# Patient Record
Sex: MALE | Race: WHITE | NOT HISPANIC OR LATINO
[De-identification: names, ages, dates, MRNs, and addresses within clinical notes are randomized per-mention and may not be internally consistent; named-entity substitution may affect disease eponyms.]

---

## 2019-06-15 ENCOUNTER — HOSPITAL (OUTPATIENT)
Dept: OTHER | Age: 29
End: 2019-06-15
Attending: EMERGENCY MEDICINE

## 2019-06-18 LAB
BACTERIA STL CULT: NORMAL

## 2020-08-13 ENCOUNTER — OFFICE VISIT (OUTPATIENT)
Dept: INTERNAL MEDICINE CLINIC | Facility: CLINIC | Age: 30
End: 2020-08-13
Payer: COMMERCIAL

## 2020-08-13 VITALS
TEMPERATURE: 98 F | OXYGEN SATURATION: 99 % | DIASTOLIC BLOOD PRESSURE: 86 MMHG | SYSTOLIC BLOOD PRESSURE: 124 MMHG | WEIGHT: 202 LBS | HEIGHT: 71.75 IN | RESPIRATION RATE: 16 BRPM | BODY MASS INDEX: 27.66 KG/M2 | HEART RATE: 89 BPM

## 2020-08-13 DIAGNOSIS — Z00.00 PREVENTATIVE HEALTH CARE: ICD-10-CM

## 2020-08-13 DIAGNOSIS — K92.1 HEMATOCHEZIA: ICD-10-CM

## 2020-08-13 DIAGNOSIS — K42.9 UMBILICAL HERNIA WITHOUT OBSTRUCTION AND WITHOUT GANGRENE: Primary | ICD-10-CM

## 2020-08-13 PROCEDURE — 3074F SYST BP LT 130 MM HG: CPT | Performed by: INTERNAL MEDICINE

## 2020-08-13 PROCEDURE — 99203 OFFICE O/P NEW LOW 30 MIN: CPT | Performed by: INTERNAL MEDICINE

## 2020-08-13 PROCEDURE — 3008F BODY MASS INDEX DOCD: CPT | Performed by: INTERNAL MEDICINE

## 2020-08-13 PROCEDURE — 3079F DIAST BP 80-89 MM HG: CPT | Performed by: INTERNAL MEDICINE

## 2020-08-13 NOTE — PROGRESS NOTES
Suki Mao is a 26year old male. HPI:   Patient presents with:  New Patient  Establish Care  Hernia    Patient presents to establish care. He thinks he has an umbilical hernia. 1 week ago, pushed on umbilical area, felt a bulge.   He is not sure ho 16   Ht 71.75\"   Wt 202 lb (91.6 kg)   SpO2 99%   BMI 27.59 kg/m²   GENERAL: Alert and oriented, well developed, well nourished,in no apparent distress  SKIN: no rashes,no suspicious lesions  HEENT: atraumatic, PERRLA, EOMI, normal lid and conjunctiva  NE

## 2020-08-13 NOTE — PATIENT INSTRUCTIONS
- Follow up with General Surgery/Dr. Akila Patel to discuss your umbilical (belly button) hernia  - Go to the emergency department if you develop severe belly button pain, fevers/chills/sweats, severe constipation, constant nausea/vomiting (these can be signs

## 2020-08-16 PROBLEM — K92.1 HEMATOCHEZIA: Status: ACTIVE | Noted: 2020-08-16

## 2020-08-16 PROBLEM — K42.9 UMBILICAL HERNIA WITHOUT OBSTRUCTION AND WITHOUT GANGRENE: Status: ACTIVE | Noted: 2020-08-16

## 2020-08-18 ENCOUNTER — OFFICE VISIT (OUTPATIENT)
Dept: SURGERY | Facility: CLINIC | Age: 30
End: 2020-08-18
Payer: COMMERCIAL

## 2020-08-18 VITALS
WEIGHT: 202 LBS | DIASTOLIC BLOOD PRESSURE: 84 MMHG | HEART RATE: 69 BPM | TEMPERATURE: 98 F | BODY MASS INDEX: 27.66 KG/M2 | SYSTOLIC BLOOD PRESSURE: 140 MMHG | HEIGHT: 71.75 IN

## 2020-08-18 DIAGNOSIS — K92.1 HEMATOCHEZIA: ICD-10-CM

## 2020-08-18 DIAGNOSIS — K42.9 UMBILICAL HERNIA WITHOUT OBSTRUCTION AND WITHOUT GANGRENE: Primary | ICD-10-CM

## 2020-08-18 DIAGNOSIS — K64.8 BLEEDING INTERNAL HEMORRHOIDS: ICD-10-CM

## 2020-08-18 PROCEDURE — 46600 DIAGNOSTIC ANOSCOPY SPX: CPT | Performed by: SURGERY

## 2020-08-18 PROCEDURE — 3079F DIAST BP 80-89 MM HG: CPT | Performed by: SURGERY

## 2020-08-18 PROCEDURE — 99244 OFF/OP CNSLTJ NEW/EST MOD 40: CPT | Performed by: SURGERY

## 2020-08-18 PROCEDURE — 3077F SYST BP >= 140 MM HG: CPT | Performed by: SURGERY

## 2020-08-18 PROCEDURE — 3008F BODY MASS INDEX DOCD: CPT | Performed by: SURGERY

## 2020-08-18 NOTE — H&P (VIEW-ONLY)
New Patient Visit Note       Active Problems      1. Umbilical hernia without obstruction and without gangrene    2. Hematochezia    3.  Bleeding internal hemorrhoids        Chief Complaint   Patient presents with:  Umbilical Hernia: pt ref by Dr. Aylin fountain Past Medical / Surgical / Social / Family History    The past medical and past surgical history have been reviewed by me today. History reviewed. No pertinent past medical history.   Past Surgical History:   Procedure Laterality Date   • FEMUR FRACTURE S Abdominal: He exhibits no distension and no mass. There is no tenderness. There is no rebound and no guarding. A hernia is present. Hernia confirmed positive in the ventral area.  Hernia confirmed negative in the right inguinal area and confirmed negative i · The cherelle-operative care plan was discussed with the patient, who voices understanding. Activity and lifting recommendations were discussed in length. ·   · The risks, benefits, and alternatives to the procedure were explained to the patient.   The risk

## 2020-08-18 NOTE — H&P
New Patient Visit Note       Active Problems      1. Umbilical hernia without obstruction and without gangrene    2. Hematochezia    3.  Bleeding internal hemorrhoids        Chief Complaint   Patient presents with:  Umbilical Hernia: pt ref by Dr. Violet fountain Past Medical / Surgical / Social / Family History    The past medical and past surgical history have been reviewed by me today. History reviewed. No pertinent past medical history.   Past Surgical History:   Procedure Laterality Date   • FEMUR FRACTURE S Abdominal: He exhibits no distension and no mass. There is no tenderness. There is no rebound and no guarding. A hernia is present. Hernia confirmed positive in the ventral area.  Hernia confirmed negative in the right inguinal area and confirmed negative i · The cherelle-operative care plan was discussed with the patient, who voices understanding. Activity and lifting recommendations were discussed in length. ·   · The risks, benefits, and alternatives to the procedure were explained to the patient.   The risk

## 2020-08-28 ENCOUNTER — APPOINTMENT (OUTPATIENT)
Dept: LAB | Age: 30
End: 2020-08-28
Attending: SURGERY
Payer: COMMERCIAL

## 2020-08-28 DIAGNOSIS — K42.9 UMBILICAL HERNIA WITHOUT OBSTRUCTION AND WITHOUT GANGRENE: ICD-10-CM

## 2020-08-29 LAB — SARS-COV-2 RNA RESP QL NAA+PROBE: NOT DETECTED

## 2020-08-30 ENCOUNTER — ANESTHESIA EVENT (OUTPATIENT)
Dept: SURGERY | Facility: HOSPITAL | Age: 30
End: 2020-08-30
Payer: COMMERCIAL

## 2020-08-31 ENCOUNTER — HOSPITAL ENCOUNTER (OUTPATIENT)
Facility: HOSPITAL | Age: 30
Setting detail: HOSPITAL OUTPATIENT SURGERY
Discharge: HOME | End: 2020-08-31
Attending: SURGERY | Admitting: SURGERY
Payer: COMMERCIAL

## 2020-08-31 ENCOUNTER — ANESTHESIA (OUTPATIENT)
Dept: SURGERY | Facility: HOSPITAL | Age: 30
End: 2020-08-31
Payer: COMMERCIAL

## 2020-08-31 ENCOUNTER — TELEPHONE (OUTPATIENT)
Dept: SURGERY | Facility: CLINIC | Age: 30
End: 2020-08-31

## 2020-08-31 VITALS
WEIGHT: 197.06 LBS | RESPIRATION RATE: 16 BRPM | DIASTOLIC BLOOD PRESSURE: 75 MMHG | BODY MASS INDEX: 27.59 KG/M2 | TEMPERATURE: 98 F | SYSTOLIC BLOOD PRESSURE: 120 MMHG | OXYGEN SATURATION: 100 % | HEIGHT: 71 IN | HEART RATE: 51 BPM

## 2020-08-31 DIAGNOSIS — K42.9 UMBILICAL HERNIA WITHOUT OBSTRUCTION AND WITHOUT GANGRENE: Primary | ICD-10-CM

## 2020-08-31 PROCEDURE — 0WQF0ZZ REPAIR ABDOMINAL WALL, OPEN APPROACH: ICD-10-PCS | Performed by: SURGERY

## 2020-08-31 PROCEDURE — 49587 REPAIR UMBILICAL HERN,5+Y/O,STRANG: CPT | Performed by: SURGERY

## 2020-08-31 RX ORDER — MEPERIDINE HYDROCHLORIDE 25 MG/ML
12.5 INJECTION INTRAMUSCULAR; INTRAVENOUS; SUBCUTANEOUS AS NEEDED
Status: DISCONTINUED | OUTPATIENT
Start: 2020-08-31 | End: 2020-08-31

## 2020-08-31 RX ORDER — NALOXONE HYDROCHLORIDE 0.4 MG/ML
80 INJECTION, SOLUTION INTRAMUSCULAR; INTRAVENOUS; SUBCUTANEOUS AS NEEDED
Status: DISCONTINUED | OUTPATIENT
Start: 2020-08-31 | End: 2020-08-31

## 2020-08-31 RX ORDER — ACETAMINOPHEN 500 MG
1000 TABLET ORAL ONCE
Status: DISCONTINUED | OUTPATIENT
Start: 2020-08-31 | End: 2020-08-31 | Stop reason: HOSPADM

## 2020-08-31 RX ORDER — LABETALOL HYDROCHLORIDE 5 MG/ML
5 INJECTION, SOLUTION INTRAVENOUS EVERY 5 MIN PRN
Status: DISCONTINUED | OUTPATIENT
Start: 2020-08-31 | End: 2020-08-31

## 2020-08-31 RX ORDER — SODIUM CHLORIDE, SODIUM LACTATE, POTASSIUM CHLORIDE, CALCIUM CHLORIDE 600; 310; 30; 20 MG/100ML; MG/100ML; MG/100ML; MG/100ML
INJECTION, SOLUTION INTRAVENOUS CONTINUOUS
Status: DISCONTINUED | OUTPATIENT
Start: 2020-08-31 | End: 2020-08-31

## 2020-08-31 RX ORDER — BUPIVACAINE HYDROCHLORIDE AND EPINEPHRINE 5; 5 MG/ML; UG/ML
INJECTION, SOLUTION EPIDURAL; INTRACAUDAL; PERINEURAL AS NEEDED
Status: DISCONTINUED | OUTPATIENT
Start: 2020-08-31 | End: 2020-08-31 | Stop reason: HOSPADM

## 2020-08-31 RX ORDER — HEPARIN SODIUM 5000 [USP'U]/ML
5000 INJECTION, SOLUTION INTRAVENOUS; SUBCUTANEOUS ONCE
Status: COMPLETED | OUTPATIENT
Start: 2020-08-31 | End: 2020-08-31

## 2020-08-31 RX ORDER — HYDROCODONE BITARTRATE AND ACETAMINOPHEN 5; 325 MG/1; MG/1
1 TABLET ORAL AS NEEDED
Status: DISCONTINUED | OUTPATIENT
Start: 2020-08-31 | End: 2020-08-31

## 2020-08-31 RX ORDER — HYDROMORPHONE HYDROCHLORIDE 1 MG/ML
0.4 INJECTION, SOLUTION INTRAMUSCULAR; INTRAVENOUS; SUBCUTANEOUS EVERY 5 MIN PRN
Status: DISCONTINUED | OUTPATIENT
Start: 2020-08-31 | End: 2020-08-31

## 2020-08-31 RX ORDER — HYDROCODONE BITARTRATE AND ACETAMINOPHEN 5; 325 MG/1; MG/1
TABLET ORAL
Qty: 20 TABLET | Refills: 0 | Status: SHIPPED | OUTPATIENT
Start: 2020-08-31 | End: 2020-09-22

## 2020-08-31 RX ORDER — CEFAZOLIN SODIUM/WATER 2 G/20 ML
2 SYRINGE (ML) INTRAVENOUS ONCE
Status: COMPLETED | OUTPATIENT
Start: 2020-08-31 | End: 2020-08-31

## 2020-08-31 RX ORDER — MIDAZOLAM HYDROCHLORIDE 1 MG/ML
INJECTION INTRAMUSCULAR; INTRAVENOUS AS NEEDED
Status: DISCONTINUED | OUTPATIENT
Start: 2020-08-31 | End: 2020-08-31 | Stop reason: SURG

## 2020-08-31 RX ORDER — HYDROCODONE BITARTRATE AND ACETAMINOPHEN 5; 325 MG/1; MG/1
1 TABLET ORAL EVERY 6 HOURS PRN
Qty: 20 TABLET | Refills: 0 | Status: SHIPPED | OUTPATIENT
Start: 2020-08-31 | End: 2020-09-22

## 2020-08-31 RX ORDER — DOCUSATE SODIUM 100 MG/1
100 CAPSULE, LIQUID FILLED ORAL 3 TIMES DAILY
Qty: 90 CAPSULE | Refills: 0 | Status: SHIPPED | OUTPATIENT
Start: 2020-08-31

## 2020-08-31 RX ORDER — ONDANSETRON 2 MG/ML
4 INJECTION INTRAMUSCULAR; INTRAVENOUS AS NEEDED
Status: DISCONTINUED | OUTPATIENT
Start: 2020-08-31 | End: 2020-08-31

## 2020-08-31 RX ORDER — HYDROCODONE BITARTRATE AND ACETAMINOPHEN 5; 325 MG/1; MG/1
2 TABLET ORAL AS NEEDED
Status: DISCONTINUED | OUTPATIENT
Start: 2020-08-31 | End: 2020-08-31

## 2020-08-31 RX ORDER — MIDAZOLAM HYDROCHLORIDE 1 MG/ML
1 INJECTION INTRAMUSCULAR; INTRAVENOUS EVERY 5 MIN PRN
Status: DISCONTINUED | OUTPATIENT
Start: 2020-08-31 | End: 2020-08-31

## 2020-08-31 RX ORDER — ACETAMINOPHEN 500 MG
1000 TABLET ORAL ONCE AS NEEDED
Status: DISCONTINUED | OUTPATIENT
Start: 2020-08-31 | End: 2020-08-31

## 2020-08-31 RX ADMIN — MIDAZOLAM HYDROCHLORIDE 2 MG: 1 INJECTION INTRAMUSCULAR; INTRAVENOUS at 13:32:00

## 2020-08-31 RX ADMIN — CEFAZOLIN SODIUM/WATER 2 G: 2 G/20 ML SYRINGE (ML) INTRAVENOUS at 13:40:00

## 2020-08-31 RX ADMIN — SODIUM CHLORIDE, SODIUM LACTATE, POTASSIUM CHLORIDE, CALCIUM CHLORIDE: 600; 310; 30; 20 INJECTION, SOLUTION INTRAVENOUS at 14:21:00

## 2020-08-31 NOTE — TELEPHONE ENCOUNTER
8/31 pt had surgery today and states he did not get any paperwork or pain medication. Norco script sent today with receipt confirmation at 2:07pm. Called pharmacy to confirm  And they cannot find the script. New script sent by PA.  Pt also stated he did not

## 2020-08-31 NOTE — INTERVAL H&P NOTE
Pre-op Diagnosis: Umbilical hernia without obstruction and without gangrene [K42.9]    The above referenced H&P was reviewed by Tyrone Mcrae MD on 8/31/2020, the patient was examined and no significant changes have occurred in the patient's condition

## 2020-08-31 NOTE — BRIEF OP NOTE
Pre-Operative Diagnosis: Umbilical hernia without obstruction and without gangrene [K42.9]     Post-Operative Diagnosis: Umbilical hernia without obstruction and without gangrene [K42.9]      Procedure Performed:   Procedure(s):    UMBILICAL HERNIORRHAPHY

## 2020-08-31 NOTE — ANESTHESIA PREPROCEDURE EVALUATION
PRE-OP EVALUATION    Patient Name: Vazquez Perry    Pre-op Diagnosis: Umbilical hernia without obstruction and without gangrene [K42.9]    Procedure(s):    UMBILICAL HERNIORRHAPHY    Surgeon(s) and Role:     * Karen Deleon MD - Primary    Pre-op vi for MAC.  A detailed discussion of the risks and benefits of the proposed anesthetic was had in the preoperative area, including risk of conversion to a general anesthetic, nausea/vomiting, dental damage, sore throat and allergic/ adverse reactions to [employer]

## 2020-08-31 NOTE — PROGRESS NOTES
Pt states he has poison ivy on R lower leg near ankle area for past 2 weeks. Reddened area approx 2 inch circled area weeping, open to air, denies pain.

## 2020-08-31 NOTE — ANESTHESIA POSTPROCEDURE EVALUATION
[facility] [place] Patient Status:  Hospital Outpatient Surgery   Age/Gender 26year old male MRN [id number]   Location [facility] Attending Bridger Jara MD   Hosp Day # 0 PCP Dianne Camargo MD       Anesth

## 2020-08-31 NOTE — OPERATIVE REPORT
PREOPERATIVE DIAGNOSIS: Incarcerated umbilical hernia. POSTOPERATIVE DIAGNOSIS: Incarcerated umbilical hernia. PROCEDURE PERFORMED: Repair of incarcerated umbilical hernia. ASSISTANT: OR staff. ANESTHESIA: Monitored anesthesia care.    ANESTHESIOLOG usual sterile fashion. Local anesthetic is injected in the superior aspect of the umbilicus, and a curvilinear incision is created in the superior aspect of the umbilicus using a scalpel to incise the skin and subcutaneous tissue.  Blunt dissection is bibiana

## 2020-09-08 ENCOUNTER — OFFICE VISIT (OUTPATIENT)
Dept: SURGERY | Facility: CLINIC | Age: 30
End: 2020-09-08
Payer: COMMERCIAL

## 2020-09-08 VITALS — TEMPERATURE: 100 F

## 2020-09-08 DIAGNOSIS — K64.8 BLEEDING INTERNAL HEMORRHOIDS: Primary | ICD-10-CM

## 2020-09-08 PROCEDURE — 46221 LIGATION OF HEMORRHOID(S): CPT | Performed by: SURGERY

## 2020-09-08 NOTE — PROGRESS NOTES
Follow Up Visit Note       Active Problems      1.  Bleeding internal hemorrhoids          Chief Complaint   Patient presents with:  Hemorrhoid Banding: pt denies bleeding or pain        History of Present Illness    The patient presents for rubber band lig Daily     Current Outpatient Medications   Medication Sig Dispense Refill   • HYDROcodone-acetaminophen (NORCO) 5-325 MG Oral Tab Take 1 or 2 tablets every 4  To 6 hours for pain (Patient not taking: Reported on 9/8/2020 ) 20 tablet 0   • docusate sodium ( listed in the physical exam section of this note.            Assessment   Bleeding internal hemorrhoids  (primary encounter diagnosis)    Plan     · Successful treatment of internal hemorrhoid of the right anterolateral position by rubber band ligation and

## 2020-09-11 ENCOUNTER — MED REC SCAN ONLY (OUTPATIENT)
Dept: SURGERY | Facility: CLINIC | Age: 30
End: 2020-09-11

## 2020-09-22 ENCOUNTER — OFFICE VISIT (OUTPATIENT)
Dept: SURGERY | Facility: CLINIC | Age: 30
End: 2020-09-22
Payer: COMMERCIAL

## 2020-09-22 VITALS
TEMPERATURE: 98 F | DIASTOLIC BLOOD PRESSURE: 89 MMHG | BODY MASS INDEX: 28 KG/M2 | WEIGHT: 200 LBS | HEART RATE: 60 BPM | SYSTOLIC BLOOD PRESSURE: 136 MMHG

## 2020-09-22 DIAGNOSIS — K64.8 BLEEDING INTERNAL HEMORRHOIDS: Primary | ICD-10-CM

## 2020-09-22 PROCEDURE — 3079F DIAST BP 80-89 MM HG: CPT | Performed by: SURGERY

## 2020-09-22 PROCEDURE — 3075F SYST BP GE 130 - 139MM HG: CPT | Performed by: SURGERY

## 2020-09-22 PROCEDURE — 46221 LIGATION OF HEMORRHOID(S): CPT | Performed by: SURGERY

## 2020-09-22 NOTE — PROGRESS NOTES
Follow Up Visit Note       Active Problems      1. Bleeding internal hemorrhoids          Chief Complaint   Patient presents with:  Hemorrhoid Banding: continuity of care for 2 nd banding. Denies pain nor bleeding. Normal BM.  Denies fever, chills, nausea n on 9/8/2020 ) 90 capsule 0        Review of Systems  The Review of Systems has been reviewed by me during today.   Review of Systems     Physical Findings   /89   Pulse 60   Temp 98.2 °F (36.8 °C)   Wt 200 lb (90.7 kg)   BMI 27.89 kg/m²   Pre op diagn dietary fiber supplementation, stool softeners, and adequate hydration. ·   · Follow-up in 2 weeks for continued treatment of internal hemorrhoids. ·   · The patient was provided ample opportunity to ask questions.   · All of the patient's questions were

## 2020-09-22 NOTE — PROGRESS NOTES
Follow Up Visit Note       Active Problems      No diagnosis found. Chief Complaint   Patient presents with:  Hemorrhoid Banding: continuity of care for 2 nd banding        History of Present Illness        Allergies  Mel Rosalva has No Known Allergies. (six) hours as needed for Pain. (Patient not taking: Reported on 9/8/2020 ), Disp: 20 tablet, Rfl: 0     Review of Systems  The Review of Systems has been reviewed by me during today.   Review of Systems   Constitutional: Negative for chills, diaphoresis, f

## 2020-10-06 ENCOUNTER — OFFICE VISIT (OUTPATIENT)
Dept: SURGERY | Facility: CLINIC | Age: 30
End: 2020-10-06
Payer: COMMERCIAL

## 2020-10-06 VITALS — TEMPERATURE: 98 F | HEART RATE: 63 BPM | SYSTOLIC BLOOD PRESSURE: 135 MMHG | DIASTOLIC BLOOD PRESSURE: 92 MMHG

## 2020-10-06 DIAGNOSIS — K64.8 BLEEDING INTERNAL HEMORRHOIDS: Primary | ICD-10-CM

## 2020-10-06 PROCEDURE — 3075F SYST BP GE 130 - 139MM HG: CPT | Performed by: SURGERY

## 2020-10-06 PROCEDURE — 3080F DIAST BP >= 90 MM HG: CPT | Performed by: SURGERY

## 2020-10-06 PROCEDURE — 46221 LIGATION OF HEMORRHOID(S): CPT | Performed by: SURGERY

## 2020-10-06 NOTE — PROGRESS NOTES
Follow Up Visit Note       Active Problems      No diagnosis found. Chief Complaint   Patient presents with:  Hemorrhoid Banding: 3rd banding        History of Present Illness        Allergies  Elvis Moncada has No Known Allergies.     Past Medical / Surgic and trouble swallowing. Respiratory: Negative for apnea, cough, shortness of breath and wheezing. Cardiovascular: Negative for chest pain, palpitations and leg swelling.    Gastrointestinal: Negative for abdominal distention, abdominal pain, anal blee

## 2020-10-07 NOTE — PROGRESS NOTES
Follow Up Visit Note       Active Problems      1.  Bleeding internal hemorrhoids          Chief Complaint   Patient presents with:  Hemorrhoid Banding: 3rd banding        History of Present Illness    The patient presents for rubber band ligation of intern during today. Review of Systems     Physical Findings   BP (!) 135/92   Pulse 63   Temp 98.4 °F (36.9 °C)   Pre op diagnosis: Internal Hemorrhoids    Post op diagnosis: Same    Procedure:  Anoscopy with O-ring Rubber Band Ligation of Internal Hemorrhoids was provided ample opportunity to ask questions. · All of the patient's questions were answered in detail. · The patient voiced understanding of the care plan. No orders of the defined types were placed in this encounter.       Imaging & Referrals

## 2020-10-20 ENCOUNTER — OFFICE VISIT (OUTPATIENT)
Dept: SURGERY | Facility: CLINIC | Age: 30
End: 2020-10-20
Payer: COMMERCIAL

## 2020-10-20 VITALS
DIASTOLIC BLOOD PRESSURE: 94 MMHG | HEART RATE: 57 BPM | TEMPERATURE: 98 F | BODY MASS INDEX: 28 KG/M2 | WEIGHT: 200 LBS | RESPIRATION RATE: 16 BRPM | SYSTOLIC BLOOD PRESSURE: 149 MMHG

## 2020-10-20 DIAGNOSIS — K64.8 BLEEDING INTERNAL HEMORRHOIDS: Primary | ICD-10-CM

## 2020-10-20 PROCEDURE — 46600 DIAGNOSTIC ANOSCOPY SPX: CPT | Performed by: SURGERY

## 2020-10-20 PROCEDURE — 99212 OFFICE O/P EST SF 10 MIN: CPT | Performed by: SURGERY

## 2020-10-20 PROCEDURE — 3080F DIAST BP >= 90 MM HG: CPT | Performed by: SURGERY

## 2020-10-20 PROCEDURE — 3077F SYST BP >= 140 MM HG: CPT | Performed by: SURGERY

## 2020-10-20 NOTE — PROGRESS NOTES
Follow Up Visit Note       Active Problems      1. Bleeding internal hemorrhoids          Chief Complaint   Patient presents with:  Hemorrhoids: hemorrhoid recheck.  Pt states 'is doing well, no bleeding, no pain.'         History of Present Illness    The use: Not Currently        Types: Cannabis        Comment: rare    Other Topics      Concerns:        Caffeine Concern: Yes          2 cups of coffee daily        Exercise: Yes          Daily       Current Outpatient Medications:   •  docusate sodium (COLAC enlarged and not tender.     Genitourinary Comments: No Prostate Nodule  Anal Sphincter Intact  No Pruritis Ani  No Lichenification  No Abscess  No Fistula in ano  No Anterior Fissure  No Posterior Fissure    See Procedures:  Anoscopy reveals all treated ar

## 2022-12-22 ENCOUNTER — OFFICE VISIT (OUTPATIENT)
Dept: INTERNAL MEDICINE CLINIC | Facility: CLINIC | Age: 32
End: 2022-12-22
Payer: COMMERCIAL

## 2022-12-22 VITALS
OXYGEN SATURATION: 99 % | TEMPERATURE: 98 F | HEIGHT: 72.84 IN | RESPIRATION RATE: 16 BRPM | HEART RATE: 78 BPM | WEIGHT: 216 LBS | SYSTOLIC BLOOD PRESSURE: 120 MMHG | DIASTOLIC BLOOD PRESSURE: 80 MMHG | BODY MASS INDEX: 28.63 KG/M2

## 2022-12-22 DIAGNOSIS — Z00.00 ENCOUNTER FOR PREVENTATIVE ADULT HEALTH CARE EXAMINATION: Primary | ICD-10-CM

## 2022-12-22 DIAGNOSIS — R09.81 NASAL CONGESTION: ICD-10-CM

## 2022-12-22 PROCEDURE — 3079F DIAST BP 80-89 MM HG: CPT | Performed by: INTERNAL MEDICINE

## 2022-12-22 PROCEDURE — 3008F BODY MASS INDEX DOCD: CPT | Performed by: INTERNAL MEDICINE

## 2022-12-22 PROCEDURE — 99395 PREV VISIT EST AGE 18-39: CPT | Performed by: INTERNAL MEDICINE

## 2022-12-22 PROCEDURE — 3074F SYST BP LT 130 MM HG: CPT | Performed by: INTERNAL MEDICINE

## 2022-12-22 RX ORDER — FLUTICASONE PROPIONATE 50 MCG
2 SPRAY, SUSPENSION (ML) NASAL DAILY
Qty: 1 EACH | Refills: 0 | Status: SHIPPED | OUTPATIENT
Start: 2022-12-22

## 2022-12-22 NOTE — PATIENT INSTRUCTIONS
- Get blood tests done when fasting (8 hours, water and medications only). You can schedule a lab appointment through [address] [place] [address] [address] [place] [address]. [facility], or call central scheduling at [phone].  - For your congestion/throat symptoms, start prescription fluticasone nasal spray. Use puff in each nostril twice daily for next two weeks. - Will send you a [facility] message regarding lab results after you get them done. - Colon cancer screening (colonoscopy) starts at age 39, prostate cancer screening (PSA blood test) at age 36. It was a pleasure seeing you in the clinic today. Thank you for choosing the [facility] office for your healthcare needs. Please call at [phone] with any questions or concerns.     Anamika Lindsay MD

## 2025-03-08 ENCOUNTER — WALK IN (OUTPATIENT)
Dept: URGENT CARE | Age: 35
End: 2025-03-08
Attending: EMERGENCY MEDICINE

## 2025-03-08 VITALS
WEIGHT: 203 LBS | SYSTOLIC BLOOD PRESSURE: 131 MMHG | OXYGEN SATURATION: 98 % | HEART RATE: 70 BPM | RESPIRATION RATE: 17 BRPM | TEMPERATURE: 97.7 F | DIASTOLIC BLOOD PRESSURE: 86 MMHG

## 2025-03-08 DIAGNOSIS — J06.9 VIRAL URI WITH COUGH: ICD-10-CM

## 2025-03-08 DIAGNOSIS — L73.9 FOLLICULITIS: Primary | ICD-10-CM

## 2025-03-08 RX ORDER — MUPIROCIN 20 MG/G
OINTMENT TOPICAL 3 TIMES DAILY
Qty: 22 G | Refills: 0 | Status: SHIPPED | OUTPATIENT
Start: 2025-03-08 | End: 2025-03-15

## 2025-03-08 ASSESSMENT — PAIN SCALES - GENERAL
PAINLEVEL_OUTOF10: 0
PAINLEVEL: 0

## (undated) DEVICE — KENDALL SCD EXPRESS SLEEVES, KNEE LENGTH, MEDIUM: Brand: KENDALL SCD

## (undated) DEVICE — SUTURE ETHIBOND EXCEL 0 CT-2

## (undated) DEVICE — SUTURE VICRYL 2-0 SH

## (undated) DEVICE — SOL  .9 1000ML BTL

## (undated) DEVICE — STERILE POLYISOPRENE POWDER-FREE SURGICAL GLOVES: Brand: PROTEXIS

## (undated) DEVICE — LIGHT HANDLE

## (undated) DEVICE — DERMABOND LIQUID ADHESIVE

## (undated) DEVICE — CHLORAPREP 26ML APPLICATOR

## (undated) DEVICE — SUTURE MONOCRYL 4-0 PS-2

## (undated) DEVICE — SUTURE VICRYL 3-0 SH

## (undated) DEVICE — BREAST-HERNIA-PORT CDS-LF: Brand: MEDLINE INDUSTRIES, INC.

## (undated) NOTE — LETTER
20    Patient: Jada Helton  : 1990 Visit date: 2020    Dear  Jose Alberto Dillon MD    Thank you for referring Jada Helton to my practice. Please find my assessment and plan below.         Assessment   Bleeding internal hemorrhoids

## (undated) NOTE — LETTER
10/20/20    Patient: Seble Martinez  : 1990 Visit date: 10/20/2020    Dear  Irais Gonzalez MD    Thank you for referring Seble Martinez to my practice. Please find my assessment and plan below.     Assessment   Bleeding internal hemorrhoids

## (undated) NOTE — LETTER
10/07/20    Patient: Kwabena Viveros  : 1990 Visit date: 10/6/2020    Dear  Catrachita Pimentel MD    Thank you for referring Kwabena Viveros to my practice. Please find my assessment and plan below.         Assessment   Bleeding internal hemorrhoid

## (undated) NOTE — LETTER
20    Patient: Kyler Khan  : 1990 Visit date: 2020    Dear  Dr. Jesika Aragon MD,    Thank you for referring Jacintoyoly Erin to my practice. Please find my assessment and plan below.              Assessment   Umbilical hernia without obstr

## (undated) NOTE — LETTER
20    Patient: Hudson Chapa  : 1990 Visit date: 2020    Dear  Yvonne Frausto MD    Thank you for referring Hudson Chapa to my practice. Please find my assessment and plan below.         Assessment   Bleeding internal hemorrhoid

## (undated) NOTE — LETTER
09/15/20        [address] [facility] [place] [address] [address]      Dear Ari Beach,    [facility] records indicate that you have outstanding lab work and or testing that was ordered for you and has not yet been completed: Fasting lab work - (at least 10 hours-